# Patient Record
Sex: MALE | Race: WHITE
[De-identification: names, ages, dates, MRNs, and addresses within clinical notes are randomized per-mention and may not be internally consistent; named-entity substitution may affect disease eponyms.]

---

## 2018-07-21 NOTE — HP
PREOPERATIVE HISTORY AND PHYSICAL:

 

DATE OF ADMISSION/SURGERY:  08/03/18 - [facility]

 

DATE OF OFFICE VISIT/ENCOUNTER:  07/12/18

 

ATTENDING SURGEON:  Vicenta Gonzales MD * (DICTATED BY SUKH PARKER)

 

PROCEDURE:  Excision mass, left hand.

 

CHIEF COMPLAINT:  Mass, left hand with associated numbness and tingling.

 

HISTORY OF PRESENT ILLNESS:  This is a 68-year-old male, he complains of a mass 
that has been present in the palm of his left hand for several years.  Over the 
past few months, he has started noticing numbness and tingling primarily in the 
left middle and ring fingers.  It does seem to be getting a bit progressively 
worse.  It is always there to a certain extent but will vary with intensity.  
He denies any injury to the hand.  He reports that sometimes his ring finger is 
numb enough that he is missing the key on his computer.  He does not 
particularly complain of much pain.  It is more the numbness and tingling that 
is bothersome. He denies any numbness and tingling in the left pinky and does 
not think that he has any thumb or any index finger tingling as well.  He had 
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  He has 
consented to proceed with surgical intervention at this time for excision of 
the mass from his left hand.

 

PAST MEDICAL HISTORY:

1.  Hypertension.

2.  Cholesterol.

3.  Diverticulitis.

4.  Arthritis.

 

PAST SURGICAL HISTORY:

1.  Varicose vein surgery.

2.  Dental implant.

3.  Hernia repair.

4.  Skin lesion excised from chest.

 

MEDICATIONS:

1.  Aspirin 81 mg daily.

2.  Cialis 5 mg daily.

3.  Ipratropium bromide 0.03% spray, 2 sprays each nostril 2 times daily p.r.n.

4.  Losartan potassium/hydrochlorothiazide _____ mg daily.

5.  Lovastatin 20 mg 1 tab at bedtime.

6.  Lovastatin 40 mg 1 tab at bedtime.

 

ALLERGIES:  PENICILLIN causes throat swelling.

 

FAMILY HISTORY:  Family medical history is noncontributory.

 

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  
He denies tobacco use and recreational drug use.  He does drink alcohol on 
regular occasion, approximately 4 to 5 times a week.

 

REVIEW OF SYSTEMS:  General:  Negative for fevers, chills, night sweats, 
unexplained weight loss/gain.  No known anesthesia problems.  HEENT:  Negative 
for headache, lightheadedness, syncopal episodes, visual changes.  Integumentary
: Negative for abrasions, lesions, open wounds.  Cardiothoracic:  Negative for 
hypertension, chest pain, palpitations, edema.  Respiratory:  Negative for 
shortness of breath with exertion, chronic cough, wheezing.  GI:  Negative for 
nausea, vomiting, diarrhea, constipation, GERD.  :  Negative for nocturia, 
urinary frequency, urgency, history of UTIs, kidney problems.  Musculoskeletal: 
Positive for current complaint.  Negative for chronic or intermittent back pain 
or history of fractures.  Neurologic:  Positive for numbness and tingling in 
left hand fingers.  Negative for history of seizure, stroke, poor balance.  
Endocrine: Negative for diabetes and thyroid issues.  Hematologic:  Negative 
for easy bruising, anemia, bleeding disorders, history of DVT.  Infectious 
Disease: Negative for history of MRSA, hepatitis C, HIV.

 

                               PHYSICAL EXAMINATION

 

GENERAL:  Well-developed, well-nourished, 68-year-old male in no acute distress.

 

VITAL SIGNS:  Height 5 feet 9 inches, weight 208 pounds, pulse rate 73, blood 
pressure 136/82.

 

HEENT:  Normocephalic, atraumatic.  Pupils equal, round, and reactive to light 
and accommodation.  Extraocular movements are intact.

 

NECK:  Supple.  No palpable lymph nodes.  Throat is clear.

 

PULMONARY:  Lungs are clear to auscultation bilaterally.  No wheezes, rales, or 
rhonchi.

 

CARDIOVASCULAR:  Regular rate and rhythm.  S1, S2.  No murmurs, rubs, or 
gallops. No edema.

 

ABDOMEN:  Positive bowel sounds, soft, nontender.

 

NEUROLOGIC:  Alert and oriented x3.  Cranial nerves II through XII are intact. 
Sensation is intact to light touch.

 

MUSCULOSKELETAL:  On exam of his left hand, there is a visible moderate size 
mass in the palm of his hand in line with the ring and middle fingers.  It is 
soft and mildly tender with pressure.  Pressure causes numbness and tingling to 
increase in the ring and middle fingers.  There is no erythema.  Skin is 
intact.  There is also evidence of Dupuytren's puckering just distal to the 
mass in line with the ring finger that is nontender as well.  The patient has 
full range of motion of his fingers and good strength in his flexor tendons and 
wrist flexion.  He has a positive median nerve compression test.  Sensation is 
intact throughout the hand.

 

 IMAGING STUDIES:  X-rays of the left hand are unremarkable for any osseous 
abnormalities.

 

MRI of the left hand shows a large mass in the hand, most consistent with a 
nerve sheath tumor.

 

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

 

PLAN:  The patient is scheduled to undergo an excision mass, left hand, with 
Dr. Gonzales on 08/03/18.  He will return to the office 10 days postop for 
followup and suture removal.  A prescription for Ultracet was e-scribed to the 
patient's pharmacy for postoperative pain management.

 

 ____________________________________ SUKH PARKER

 

[id number] #: [id number]

MTDVIC

## 2018-08-03 ENCOUNTER — HOSPITAL ENCOUNTER (OUTPATIENT)
Dept: HOSPITAL 25 - OREAST | Age: 68
Discharge: HOME | End: 2018-08-03
Attending: ORTHOPAEDIC SURGERY
Payer: MEDICARE

## 2018-08-03 VITALS — DIASTOLIC BLOOD PRESSURE: 77 MMHG | SYSTOLIC BLOOD PRESSURE: 124 MMHG

## 2018-08-03 DIAGNOSIS — E78.00: ICD-10-CM

## 2018-08-03 DIAGNOSIS — M19.90: ICD-10-CM

## 2018-08-03 DIAGNOSIS — D18.01: Primary | ICD-10-CM

## 2018-08-03 DIAGNOSIS — R20.0: ICD-10-CM

## 2018-08-03 DIAGNOSIS — J30.2: ICD-10-CM

## 2018-08-03 DIAGNOSIS — I10: ICD-10-CM

## 2018-08-03 PROCEDURE — 88304 TISSUE EXAM BY PATHOLOGIST: CPT

## 2018-08-03 NOTE — OP
CC:  Dr. Gonzales

 

OPERATIVE REPORT:

 

DATE OF OPERATION:  08/03/18.

 

DATE OF BIRTH:  [birth year].

 

SURGEON:  Vicenta Gonzales M.D.

 

ASSISTANT:  SUKH Lynn

 

ANESTHESIA:  General.

 

PRE-OP DIAGNOSIS:  Left hand mass.

 

POST-OP DIAGNOSIS:  Left hand mass.

 

OPERATIVE PROCEDURE:  Removal of mass from the left hand.

 

ESTIMATED BLOOD LOSS:  Zero.

 

TOURNIQUET TIME:  About 25 minutes.

 

INDICATIONS FOR PROCEDURE:  Sabino is a 68-year-old man with an enlarging mass in the palm of his lef
t hand.  He has symptoms of tingling and numbness in his median nerve distribution.  MRI shows a well
-circumscribed mass with a mass effect on the flexor tendons and the median nerve.  He presents for r
emoval of the mass.

 

DESCRIPTION OF PROCEDURE:  The patient was brought to the operating room, was given a general anesthe
tic and placed in the supine position on the operating table with a tourniquet around his left forear
m.  The skin of his left hand and forearm was prepped and draped in the usual sterile fashion.  The h
and and forearm were exsanguinated and the tourniquet elevated to 250 mmHg.  A Chevron incision was m
josseline centered over the mass in the mid aspect of the palm and the skin edges were carefully dissected 
up over the mass.  It was a large 5x3 cm mass with mass effect on the median nerve.  It was carefully
 dissected out from the median nerve fibers, but very well circumscribed and not at all invasive.  It
 was sent for pathology. The wound was irrigated and the skin edges were reapproximated with 4-0 nylo
n suture.  The wound was dressed with Xeroform, 4x4s, Webril, and an ACE wrap.  The patient tolerated
 the procedure well, and was brought to the recovery room in good condition.

 

 [id number]/CPS #: [id number]